# Patient Record
Sex: FEMALE | Race: WHITE | NOT HISPANIC OR LATINO | Employment: OTHER | URBAN - NONMETROPOLITAN AREA
[De-identification: names, ages, dates, MRNs, and addresses within clinical notes are randomized per-mention and may not be internally consistent; named-entity substitution may affect disease eponyms.]

---

## 2022-04-12 ENCOUNTER — OFFICE VISIT (OUTPATIENT)
Dept: CARDIOLOGY | Facility: CLINIC | Age: 42
End: 2022-04-12

## 2022-04-12 VITALS
SYSTOLIC BLOOD PRESSURE: 122 MMHG | WEIGHT: 169.6 LBS | HEART RATE: 85 BPM | DIASTOLIC BLOOD PRESSURE: 74 MMHG | HEIGHT: 64 IN | BODY MASS INDEX: 28.95 KG/M2

## 2022-04-12 DIAGNOSIS — Z82.49 FAMILY HISTORY OF ISCHEMIC HEART DISEASE (IHD): ICD-10-CM

## 2022-04-12 DIAGNOSIS — E78.00 ELEVATED LDL CHOLESTEROL LEVEL: ICD-10-CM

## 2022-04-12 DIAGNOSIS — U07.0 VAPING-RELATED DISORDER: ICD-10-CM

## 2022-04-12 DIAGNOSIS — E66.3 OVERWEIGHT: ICD-10-CM

## 2022-04-12 DIAGNOSIS — R00.2 PALPITATIONS: Primary | ICD-10-CM

## 2022-04-12 DIAGNOSIS — R01.1 HEART MURMUR: ICD-10-CM

## 2022-04-12 PROCEDURE — 93000 ELECTROCARDIOGRAM COMPLETE: CPT | Performed by: NURSE PRACTITIONER

## 2022-04-12 PROCEDURE — 99204 OFFICE O/P NEW MOD 45 MIN: CPT | Performed by: NURSE PRACTITIONER

## 2022-04-12 NOTE — PROGRESS NOTES
Chief Complaint   Patient presents with   • Establish Care     Pt is here to establish care for palpitations.  Pt states that her palpitations are timed usually the week of what would be her period (she had an ablation about 5 years ago).  She states during that time foods can trigger her palpitations.   Pt denies CP, dizziness or SOB.  She has been trying to lose weight, by working out and changing her diet.  She states her PCP did call her BP meds for her recently, but she has not picked them up.   • Cardiac History     Pt states that she was previously evaluated 7-8 years ago by Dr MOSQUEDA.  She states she had an echo and bubble study did at that time.  Pt has completed holiter monitors twice.   • Lab Work     Pt's last labs were  in March with her PCP.       Cardiac Complaints  palpitations      Subjective   Martha Josue is a 41 y.o. female with palpitations and fibromyalgia. She is referred today by PCP after she developed more palpitations in the form of heart racing. She states most often they only occur once a month when she has hormonal issues around what she feels is her monthly cycle. Patient reports they often last minutes and feel like a pounding in her chest. She no longer has periods since she had an ablation. She does admit palpitations have been present for many years as she saw Dr. MOSQUEDA in the past for cardiac issues and was worked up for the same. Holter and echo with bubble reported as negative by the patient on testing done on what she believes was 8 years ago. She does report certain food she ingests will cause palpitations. She does report BP is up at times, but mostly if she is anxious. Most often she admits it is very well managed. She does report a more active lifestyle of late and has been working out and changing her diet in an attempt to lose weight. Labs from March reviewed from PCP showed: HH 13.5/39.5, VIT D 33.8, TRIG 76, HDL 48, , BUN 13, Creatinine 0.83, Na 139, K 4.0, AST 15, ALT  17, GFR 90, TSH 1.210. Strong family history of heart disease reported. Vaping use noted.         Cardiac History  Past Surgical History:   Procedure Laterality Date   • CERVICAL CERCLAGE  2011       Current Outpatient Medications   Medication Sig Dispense Refill   • Cholecalciferol (Vitamin D3) 50 MCG (2000 UT) chewable tablet Chew 2,000 Units Daily.     • MAGNESIUM CITRATE PO Take 165 mg by mouth Daily.     • VITAMIN B COMPLEX-C PO Take 1 tablet by mouth Daily.       No current facility-administered medications for this visit.       Codeine and Penicillins    Past Medical History:   Diagnosis Date   • Fibromyalgia    • H/O  section 2012   • History of dilation and curettage    • History of endometrial ablation    • History of right breast biopsy    • History of umbilical hernia repair    • Palpitations        Social History     Socioeconomic History   • Marital status:    Tobacco Use   • Smoking status: Former Smoker     Quit date:      Years since quittin.2   • Smokeless tobacco: Never Used   Vaping Use   • Vaping Use: Every day   • Substances: Nicotine, Flavoring   • Devices: Disposable   Substance and Sexual Activity   • Alcohol use: Yes     Comment: social   • Drug use: Never       Family History   Problem Relation Age of Onset   • Lymphoma Mother    • Hyperlipidemia Father    • Hypertension Father    • Diabetes Father    • Hypertension Sister    • Hyperlipidemia Sister    • Lupus Sister    • Hyperlipidemia Half-Brother    • Hypertension Half-Brother    • Heart attack Half-Brother    • Heart failure Maternal Grandmother    • Tuberculosis Maternal Grandmother    • Heart attack Maternal Grandfather    • Cancer Paternal Grandmother    • Heart attack Paternal Grandfather    • No Known Problems Daughter    • No Known Problems Daughter    • No Known Problems Son        Review of Systems   Constitutional: Negative for malaise/fatigue and night sweats.   Cardiovascular:  "Positive for palpitations. Negative for chest pain, claudication, dyspnea on exertion, irregular heartbeat, leg swelling, near-syncope, orthopnea and syncope.   Respiratory: Negative for cough, shortness of breath and wheezing.    Musculoskeletal: Positive for stiffness. Negative for back pain and joint pain.   Gastrointestinal: Negative for anorexia, heartburn, melena, nausea and vomiting.   Genitourinary: Negative for dysuria, hematuria, hesitancy and nocturia.   Neurological: Negative for dizziness, headaches and light-headedness.   Psychiatric/Behavioral: Negative for depression and memory loss. The patient is not nervous/anxious.            Objective     /74 (BP Location: Right arm, Patient Position: Sitting)   Pulse 85   Ht 162.6 cm (64\")   Wt 76.9 kg (169 lb 9.6 oz)   BMI 29.11 kg/m²     Constitutional:       Appearance: Not in distress.   Eyes:      Pupils: Pupils are equal, round, and reactive to light.   HENT:      Nose: Nose normal.   Pulmonary:      Effort: Pulmonary effort is normal.      Breath sounds: Normal breath sounds.   Cardiovascular:      PMI at left midclavicular line. Normal rate. Regular rhythm.      Murmurs: There is a systolic murmur.   Abdominal:      Palpations: Abdomen is soft.   Musculoskeletal: Normal range of motion.      Cervical back: Normal range of motion and neck supple. Skin:     General: Skin is warm and dry.   Neurological:      Mental Status: Alert and oriented to person, place and time.           ECG 12 Lead    Date/Time: 4/12/2022 6:24 PM  Performed by: Tegan Saldaña APRN  Authorized by: Tegan Saldaña APRN   Comparison: not compared with previous ECG   Previous ECG: no previous ECG available  Rhythm: sinus rhythm  BPM: 85  Other findings comments: no ST changes    Clinical impression: non-specific ECG  Comments: Normal QT            Assessment/Plan     Palpitations/Family history of heart disease/Murmur:  Holter recommended for 7 days to assess for any " tachyarrhythmia. Patient was urged to put it on the time of the month that her symptoms normally develop. She was urged to limit caffeine intake and discuss with your office checking hormone levels in regards. TSH was WNL as was her K. Regular stress testing also advised to rule out any increase in chronotropic response with exertion or any ST changes indicative of ischemia. Echo advised to rule out any MVP, valvular concerns as palpitations are reported, soft murmur auscultated. EKG done today in regards showed a NSR with normal QT.     Elevated LDL:  Discussed dietary changes with patient to include limiting starches/carbs/fried fatty food. Krill oil to be added (OTC), walking regimen advised.     Vaping:  Patient reports vaping, but she admits she is trying to cut back in regards.     BMI noted at 29.11, good cardiac diet with limited carbs, calories, and activity as tolerated advised.     6 month follow up recommended or sooner if needed.                 Problems Addressed this Visit    None     Visit Diagnoses     Palpitations    -  Primary    Relevant Orders    Holter Monitor - 72 Hour Up To 15 Days    Adult Transthoracic Echo Complete W/ Cont if Necessary Per Protocol    Treadmill Stress Test    ECG 12 Lead    Family history of ischemic heart disease (IHD)        Heart murmur        Elevated LDL cholesterol level        Vaping-related disorder        Overweight          Diagnoses       Codes Comments    Palpitations    -  Primary ICD-10-CM: R00.2  ICD-9-CM: 785.1     Family history of ischemic heart disease (IHD)     ICD-10-CM: Z82.49  ICD-9-CM: V17.3     Heart murmur     ICD-10-CM: R01.1  ICD-9-CM: 785.2     Elevated LDL cholesterol level     ICD-10-CM: E78.00  ICD-9-CM: 272.0     Vaping-related disorder     ICD-10-CM: U07.0  ICD-9-CM: 987.9, 506.9, E869.8     Overweight     ICD-10-CM: E66.3  ICD-9-CM: 278.02           Patient's Body mass index is 29.11 kg/m². indicating that she is overweight. Good cardiac  diet advised along with working out regimen.             Electronically signed by CAM Rosenthal April 12, 2022 18:28 EDT

## 2022-05-03 ENCOUNTER — HOSPITAL ENCOUNTER (OUTPATIENT)
Dept: CARDIOLOGY | Facility: HOSPITAL | Age: 42
Discharge: HOME | End: 2022-05-03

## 2022-05-03 VITALS — WEIGHT: 169.53 LBS | BODY MASS INDEX: 28.94 KG/M2 | HEIGHT: 64 IN

## 2022-05-03 DIAGNOSIS — R00.2 PALPITATIONS: ICD-10-CM

## 2022-05-03 LAB
BH CV ECHO MEAS - ACS: 1.47 CM
BH CV ECHO MEAS - AO MAX PG: 5.4 MMHG
BH CV ECHO MEAS - AO MEAN PG: 2.48 MMHG
BH CV ECHO MEAS - AO ROOT DIAM: 2.9 CM
BH CV ECHO MEAS - AO V2 MAX: 115.9 CM/SEC
BH CV ECHO MEAS - AO V2 VTI: 24.4 CM
BH CV ECHO MEAS - EDV(CUBED): 80.9 ML
BH CV ECHO MEAS - EF_3D-VOL: 54 %
BH CV ECHO MEAS - ESV(CUBED): 26.3 ML
BH CV ECHO MEAS - FS: 31.2 %
BH CV ECHO MEAS - IVS/LVPW: 1.16 CM
BH CV ECHO MEAS - IVSD: 0.98 CM
BH CV ECHO MEAS - LA DIMENSION: 3.1 CM
BH CV ECHO MEAS - LAT PEAK E' VEL: 13.2 CM/SEC
BH CV ECHO MEAS - LV MASS(C)D: 126.8 GRAMS
BH CV ECHO MEAS - LV MAX PG: 3.3 MMHG
BH CV ECHO MEAS - LV MEAN PG: 1.64 MMHG
BH CV ECHO MEAS - LV V1 MAX: 91.5 CM/SEC
BH CV ECHO MEAS - LV V1 VTI: 19 CM
BH CV ECHO MEAS - LVIDD: 4.3 CM
BH CV ECHO MEAS - LVIDS: 3 CM
BH CV ECHO MEAS - LVPWD: 0.84 CM
BH CV ECHO MEAS - MED PEAK E' VEL: 9.1 CM/SEC
BH CV ECHO MEAS - MV A MAX VEL: 43 CM/SEC
BH CV ECHO MEAS - MV DEC SLOPE: 530.3 CM/SEC2
BH CV ECHO MEAS - MV DEC TIME: 0.18 MSEC
BH CV ECHO MEAS - MV E MAX VEL: 58.9 CM/SEC
BH CV ECHO MEAS - MV E/A: 1.37
BH CV ECHO MEAS - MV MAX PG: 3.2 MMHG
BH CV ECHO MEAS - MV MEAN PG: 1.44 MMHG
BH CV ECHO MEAS - MV P1/2T: 51.8 MSEC
BH CV ECHO MEAS - MV V2 VTI: 22.6 CM
BH CV ECHO MEAS - MVA(P1/2T): 4.3 CM2
BH CV ECHO MEAS - PA V2 MAX: 85 CM/SEC
BH CV ECHO MEAS - RAP SYSTOLE: 10 MMHG
BH CV ECHO MEAS - RV MAX PG: 1.58 MMHG
BH CV ECHO MEAS - RV V1 MAX: 62.8 CM/SEC
BH CV ECHO MEAS - RV V1 VTI: 12.1 CM
BH CV ECHO MEAS - RVDD: 3.5 CM
BH CV ECHO MEAS - RVSP: 19.4 MMHG
BH CV ECHO MEAS - TR MAX PG: 9.4 MMHG
BH CV ECHO MEAS - TR MAX VEL: 153.6 CM/SEC
BH CV ECHO MEASUREMENTS AVERAGE E/E' RATIO: 5.28
BH CV STRESS DURATION MIN STAGE 1: 3
BH CV STRESS DURATION SEC STAGE 1: 0
BH CV STRESS GRADE STAGE 1: 10
BH CV STRESS METS STAGE 1: 5
BH CV STRESS PROTOCOL 1: NORMAL
BH CV STRESS RECOVERY BP: NORMAL MMHG
BH CV STRESS RECOVERY HR: 92 BPM
BH CV STRESS SPEED STAGE 1: 1.7
BH CV STRESS STAGE 1: 1
IVRT: 124 MSEC
MAXIMAL PREDICTED HEART RATE: 178 BPM
MAXIMAL PREDICTED HEART RATE: 178 BPM
PERCENT MAX PREDICTED HR: 89.89 %
SINUS: 2.7 CM
STRESS BASELINE BP: NORMAL MMHG
STRESS BASELINE HR: 81 BPM
STRESS PERCENT HR: 106 %
STRESS POST ESTIMATED WORKLOAD: 7 METS
STRESS POST EXERCISE DUR MIN: 6 MIN
STRESS POST PEAK BP: NORMAL MMHG
STRESS POST PEAK HR: 160 BPM
STRESS TARGET HR: 151 BPM
STRESS TARGET HR: 151 BPM

## 2022-05-03 PROCEDURE — 93306 TTE W/DOPPLER COMPLETE: CPT | Performed by: INTERNAL MEDICINE

## 2022-05-03 PROCEDURE — 93017 CV STRESS TEST TRACING ONLY: CPT

## 2022-05-03 PROCEDURE — 93018 CV STRESS TEST I&R ONLY: CPT | Performed by: INTERNAL MEDICINE

## 2022-05-03 PROCEDURE — 93306 TTE W/DOPPLER COMPLETE: CPT

## 2022-05-03 RX ORDER — NEBIVOLOL 2.5 MG/1
2.5 TABLET ORAL DAILY
Qty: 30 TABLET | Refills: 11 | Status: SHIPPED | OUTPATIENT
Start: 2022-05-03

## 2022-05-03 NOTE — PROGRESS NOTES
Okay. Let Martha know, her HR and her BP were both elevated on her stress test. You should only get at about 85% of target HR and she was above that at almost 90. She can try to add a low dose of beta blocker such as bystolic 2.5mg daily if she is willing to try it and see how her HR and BP does. There is a card she can print online for costs.

## 2022-05-03 NOTE — TELEPHONE ENCOUNTER
----- Message from CAM Morel sent at 5/3/2022  3:37 PM EDT -----  Okay. Let Martha know, her HR and her BP were both elevated on her stress test. You should only get at about 85% of target HR and she was above that at almost 90. She can try to add a low dose of beta blocker such as bystolic 2.5mg daily if she is w  illing to try it and see how her HR and BP does. There is a card she can print online for costs.